# Patient Record
Sex: MALE | Race: WHITE | URBAN - METROPOLITAN AREA
[De-identification: names, ages, dates, MRNs, and addresses within clinical notes are randomized per-mention and may not be internally consistent; named-entity substitution may affect disease eponyms.]

---

## 2017-02-17 ENCOUNTER — OFFICE VISIT (OUTPATIENT)
Dept: OTOLARYNGOLOGY | Facility: CLINIC | Age: 52
End: 2017-02-17

## 2017-02-17 VITALS
HEIGHT: 72 IN | SYSTOLIC BLOOD PRESSURE: 116 MMHG | TEMPERATURE: 97 F | HEART RATE: 60 BPM | WEIGHT: 203 LBS | DIASTOLIC BLOOD PRESSURE: 77 MMHG | BODY MASS INDEX: 27.5 KG/M2

## 2017-02-17 DIAGNOSIS — J32.9 SINUSITIS, UNSPECIFIED CHRONICITY, UNSPECIFIED LOCATION: Primary | ICD-10-CM

## 2017-02-17 PROCEDURE — 99212 OFFICE O/P EST SF 10 MIN: CPT | Performed by: OTOLARYNGOLOGY

## 2017-02-17 PROCEDURE — 99214 OFFICE O/P EST MOD 30 MIN: CPT | Performed by: OTOLARYNGOLOGY

## 2017-02-17 NOTE — PROGRESS NOTES
Josiah Parra is a 45year old male. Patient presents with:   Follow - Up: regarding sinusitis, improvement in symptoms with medications       HISTORY OF PRESENT ILLNESS  He presents today with a history of chronic nasal polyposis with last episode occuring intolerance. Neuro Negative Tremors. Psych Negative Anxiety and depression. Integumentary Negative Frequent skin infections, pigment change and rash. Hema/Lymph Negative Easy bleeding and easy bruising.            PHYSICAL EXAM    /77 mmHg  Pu INHALE 1 PUFF BY MOUTH ONCE DAILY AT THE SAME TIME EACH DAY FOR 90 DAYS, Disp: , Rfl: 5  •  Fluticasone Propionate 50 MCG/ACT Nasal Suspension, PLACE 1 SPRAY INTO BOTH NOSTRILS TWO TIMES DAILY.  GENTLY BLOW NOSE PRIOR TO USE., Disp: , Rfl: 1  •  hydrochloro Tablet 12 Hr, Take 1 tablet by mouth every 12 (twelve) hours. , Disp: 60 tablet, Rfl: 3  ASSESSMENT AND PLAN    1. Sinusitis, unspecified chronicity, unspecified location  Much improved and current symptoms are tolerable. Continue meds for now and RTC in thr

## 2017-05-26 ENCOUNTER — OFFICE VISIT (OUTPATIENT)
Dept: OTOLARYNGOLOGY | Facility: CLINIC | Age: 52
End: 2017-05-26

## 2017-05-26 VITALS
BODY MASS INDEX: 42.84 KG/M2 | TEMPERATURE: 97 F | SYSTOLIC BLOOD PRESSURE: 133 MMHG | WEIGHT: 306 LBS | HEIGHT: 71 IN | DIASTOLIC BLOOD PRESSURE: 83 MMHG

## 2017-05-26 DIAGNOSIS — R51.9 FOREHEAD PAIN: Primary | ICD-10-CM

## 2017-05-26 PROCEDURE — 99214 OFFICE O/P EST MOD 30 MIN: CPT | Performed by: OTOLARYNGOLOGY

## 2017-05-26 PROCEDURE — 99212 OFFICE O/P EST SF 10 MIN: CPT | Performed by: OTOLARYNGOLOGY

## 2017-05-26 RX ORDER — LORATADINE 10 MG/1
10 TABLET ORAL
COMMUNITY

## 2017-05-26 RX ORDER — ALBUTEROL SULFATE 90 UG/1
AEROSOL, METERED RESPIRATORY (INHALATION)
COMMUNITY
Start: 2017-01-26

## 2017-05-26 RX ORDER — MELOXICAM 15 MG/1
15 TABLET ORAL DAILY
Qty: 30 TABLET | Refills: 3 | Status: SHIPPED | OUTPATIENT
Start: 2017-05-26

## 2017-05-26 RX ORDER — OMEPRAZOLE 20 MG/1
CAPSULE, DELAYED RELEASE ORAL
COMMUNITY
Start: 2017-03-09 | End: 2017-05-26

## 2017-05-26 RX ORDER — MONTELUKAST SODIUM 10 MG/1
TABLET ORAL
COMMUNITY
Start: 2017-01-26 | End: 2017-05-26

## 2017-05-26 RX ORDER — AMLODIPINE BESYLATE 5 MG/1
TABLET ORAL
COMMUNITY
Start: 2017-03-09 | End: 2017-05-26

## 2017-05-26 RX ORDER — BISOPROLOL FUMARATE 5 MG/1
5 TABLET ORAL
COMMUNITY
Start: 2017-04-07

## 2017-05-26 RX ORDER — HYDROCHLOROTHIAZIDE 25 MG/1
TABLET ORAL
COMMUNITY
Start: 2017-03-09 | End: 2017-05-26

## 2017-05-26 RX ORDER — AZELASTINE 1 MG/ML
SPRAY, METERED NASAL
COMMUNITY
Start: 2017-05-08 | End: 2017-05-26

## 2017-05-26 RX ORDER — TESTOSTERONE 16.2 MG/G
GEL TRANSDERMAL
COMMUNITY
Start: 2017-05-25

## 2017-06-09 NOTE — PROGRESS NOTES
Fortino Snellen is a 47year old male. Patient presents with: Follow - Up: regarding sinusitis, c/o daily headaches       HISTORY OF PRESENT ILLNESS  He presents today with a history of chronic nasal polyposis with last episode occuring in the 80's.  Recentl Blurred vision and vision changes. Respiratory Negative Dyspnea and wheezing. Cardio Negative Chest pain, irregular heartbeat/palpitations and syncope. GI Negative Abdominal pain and diarrhea.    Endocrine Negative Cold intolerance and heat intoleranc Rfl:   •  loratadine (CLARITIN) 10 MG Oral Tab, Take 10 mg by mouth., Disp: , Rfl:   •  Albuterol Sulfate HFA (PROAIR HFA) 108 (90 Base) MCG/ACT Inhalation Aero Soln, , Disp: , Rfl:   •  ANDROGEL PUMP 20.25 MG/ACT (1.62%) Transdermal Gel, , Disp: , Rfl: Oral Tab, Take 800 mg by mouth every 6 (six) hours as needed for Pain., Disp: , Rfl:   •  Loratadine-Pseudoephedrine ER 5-120 MG Oral Tablet 12 Hr, Take 1 tablet by mouth every 12 (twelve) hours. , Disp: 60 tablet, Rfl: 3  ASSESSMENT AND PLAN    1.  Forehead

## 2017-09-08 ENCOUNTER — OFFICE VISIT (OUTPATIENT)
Dept: OTOLARYNGOLOGY | Facility: CLINIC | Age: 52
End: 2017-09-08

## 2017-09-08 VITALS
SYSTOLIC BLOOD PRESSURE: 129 MMHG | DIASTOLIC BLOOD PRESSURE: 86 MMHG | WEIGHT: 306 LBS | TEMPERATURE: 97 F | BODY MASS INDEX: 42.84 KG/M2 | HEIGHT: 71 IN

## 2017-09-08 DIAGNOSIS — R51.9 FOREHEAD PAIN: Primary | ICD-10-CM

## 2017-09-08 DIAGNOSIS — J32.1 CHRONIC FRONTAL SINUSITIS: ICD-10-CM

## 2017-09-08 PROCEDURE — 99212 OFFICE O/P EST SF 10 MIN: CPT | Performed by: OTOLARYNGOLOGY

## 2017-09-08 PROCEDURE — 99214 OFFICE O/P EST MOD 30 MIN: CPT | Performed by: OTOLARYNGOLOGY

## 2017-09-08 RX ORDER — TADALAFIL 20 MG
TABLET ORAL
COMMUNITY
Start: 2017-08-23

## 2017-09-08 RX ORDER — PSEUDOEPHEDRINE HCL 120 MG/1
120 TABLET, FILM COATED, EXTENDED RELEASE ORAL EVERY 12 HOURS
Qty: 60 TABLET | Refills: 3 | Status: SHIPPED | OUTPATIENT
Start: 2017-09-08

## 2017-09-08 RX ORDER — LORATADINE 10 MG/1
10 TABLET ORAL DAILY
Qty: 30 TABLET | Refills: 3 | Status: SHIPPED | OUTPATIENT
Start: 2017-09-08 | End: 2017-12-19

## 2017-09-08 NOTE — PROGRESS NOTES
Khadijah Warren is a 47year old male. Patient presents with:   Follow - Up: 3 month follow up- forehead pain-per pt no changes/improvement of symptoms      HISTORY OF PRESENT ILLNESS  He presents today with a history of chronic nasal polyposis with last epis Years of education:                 Number of children:               Social History Main Topics    Smoking status: Former Smoker                                                                Packs/day: 0.00      Years: 0.00        Smokeless tobacco: Normal. Skull - Normal.        Nasopharynx Normal External nose - Normal. Lips/teeth/gums - Normal. Tonsils - Normal. Oropharynx - Normal.   Ears Normal Inspection - Right: Normal, Left: Normal. Canal - Right: Normal, Left: Normal. TM - Right: Normal, Left Disp: , Rfl: 0  •  omeprazole 20 MG Oral Capsule Delayed Release, TAKE 1 CAPSULE BY MOUTH TWO TIMES DAILY. , Disp: , Rfl: 0  •  Pravastatin Sodium 40 MG Oral Tab, Take 40 mg by mouth., Disp: , Rfl: 1  •  Testosterone 50 MG/5GM (1%) Transdermal Gel, APPLY TO

## 2017-09-18 ENCOUNTER — TELEPHONE (OUTPATIENT)
Dept: OTOLARYNGOLOGY | Facility: CLINIC | Age: 52
End: 2017-09-18

## 2017-09-18 NOTE — TELEPHONE ENCOUNTER
Called and spoke with the pt regarding approved CT scan order with authorization number [id number] valid from 9/18/17 until 12/17/17 with case number [id number], per Edmar Miller of [employer] pt needs to call [phone] to verify for facility for CT scan

## 2017-09-19 ENCOUNTER — TELEPHONE (OUTPATIENT)
Dept: OTOLARYNGOLOGY | Facility: CLINIC | Age: 52
End: 2017-09-19

## 2017-09-19 NOTE — TELEPHONE ENCOUNTER
Leonid calling to have Orders of [place] [facility] [place] [phone]. Please advise.  Thank you

## 2017-09-25 ENCOUNTER — TELEPHONE (OUTPATIENT)
Dept: OTOLARYNGOLOGY | Facility: CLINIC | Age: 52
End: 2017-09-25

## 2017-12-19 RX ORDER — LORATADINE 10 MG/1
TABLET ORAL
Qty: 30 TABLET | Refills: 3 | Status: SHIPPED | OUTPATIENT
Start: 2017-12-19 | End: 2018-04-25

## 2017-12-19 NOTE — TELEPHONE ENCOUNTER
Attanya Stokes. Pt is requesting refill for Loratadine 10 mg daily. LOV 09/08/17. Rx authorized on 09/08/17 for 30 tabs / 3 refills. Please review and advise.

## 2018-04-25 RX ORDER — LORATADINE 10 MG/1
TABLET ORAL
Qty: 30 TABLET | Refills: 3 | Status: SHIPPED | OUTPATIENT
Start: 2018-04-25 | End: 2018-08-19

## 2018-08-20 NOTE — TELEPHONE ENCOUNTER
Patient is requesting refill for Loratadine 10 mg daily. LOV 09/08/17. No previous refills authorized seen on chart. Please review and advise.

## 2018-08-21 RX ORDER — LORATADINE 10 MG/1
TABLET ORAL
Qty: 30 TABLET | Refills: 3 | Status: SHIPPED | OUTPATIENT
Start: 2018-08-21 | End: 2018-12-09

## 2018-11-13 ENCOUNTER — TELEPHONE (OUTPATIENT)
Dept: OTOLARYNGOLOGY | Facility: CLINIC | Age: 53
End: 2018-11-13

## 2018-11-13 NOTE — TELEPHONE ENCOUNTER
Current Outpatient Medications:   •  LORATADINE 10 MG Oral Tab, TAKE 1 TABLET BY MOUTH EVERY DAY, Disp:90 tablet, Rfl: 3    90 day supply RX [facility] [address], [place] [address]:  9/8/17    Last Refill:  8-19-18

## 2018-11-15 NOTE — TELEPHONE ENCOUNTER
LMTCB to inform patient per Dr Chico Godwin  patient need to return to office last visit was 9/8/17 if need a refill.

## 2018-11-20 NOTE — TELEPHONE ENCOUNTER
Patient is scheduled to see [facility] for yearly check up for refill last office visit was 9/8/17.

## 2018-11-26 ENCOUNTER — TELEPHONE (OUTPATIENT)
Dept: OTOLARYNGOLOGY | Facility: CLINIC | Age: 53
End: 2018-11-26

## 2018-11-26 ENCOUNTER — OFFICE VISIT (OUTPATIENT)
Dept: OTOLARYNGOLOGY | Facility: CLINIC | Age: 53
End: 2018-11-26
Payer: COMMERCIAL

## 2018-11-26 VITALS
BODY MASS INDEX: 42.66 KG/M2 | TEMPERATURE: 98 F | WEIGHT: 315 LBS | SYSTOLIC BLOOD PRESSURE: 118 MMHG | HEIGHT: 72 IN | DIASTOLIC BLOOD PRESSURE: 77 MMHG

## 2018-11-26 DIAGNOSIS — J32.9 SINUSITIS, UNSPECIFIED CHRONICITY, UNSPECIFIED LOCATION: Primary | ICD-10-CM

## 2018-11-26 PROCEDURE — 99212 OFFICE O/P EST SF 10 MIN: CPT | Performed by: OTOLARYNGOLOGY

## 2018-11-26 PROCEDURE — 99214 OFFICE O/P EST MOD 30 MIN: CPT | Performed by: OTOLARYNGOLOGY

## 2018-11-26 RX ORDER — CLINDAMYCIN HYDROCHLORIDE 300 MG/1
300 CAPSULE ORAL EVERY 8 HOURS
Qty: 30 CAPSULE | Refills: 0 | Status: SHIPPED | OUTPATIENT
Start: 2018-11-26 | End: 2018-12-03

## 2018-11-26 NOTE — PROGRESS NOTES
Darshan Kitchen is a 46year old male.   Patient presents with:  Sinus Problem: pt reports reports has a respiratory infection for 2wks, was seen in ED last night       HISTORY OF PRESENT ILLNESS  He presents today with a history of chronic nasal polyposis wit She is been on clarithromycin for about 10 days which she finished around Thanksgiving with incomplete resolution of her symptoms. She is been in the ED at [facility] 3 times at [place] once in the past week.   In 1 day he actually presented to [facility] twice for the PHYSICAL EXAM    /77 (BP Location: Left arm, Patient Position: Sitting, Cuff Size: adult)   Temp 98.1 °F (36.7 °C) (Oral)   Ht 6' (1.829 m)   Wt (!) 350 lb (158.8 kg)   BMI 47.47 kg/m²        Constitutional Normal Overall appearance - Rowan Flicker Rfl: 5  •  Fluticasone Propionate 50 MCG/ACT Nasal Suspension, PLACE 1 SPRAY INTO BOTH NOSTRILS TWO TIMES DAILY. GENTLY BLOW NOSE PRIOR TO USE., Disp: , Rfl: 1  •  hydrochlorothiazide 25 MG Oral Tab, TAKE 1 TABLET BY MOUTH DAILY.  BEST IF TAKEN WITH FOOD., HOURS AS NEEDED FOR COUGH, Disp: , Rfl: 0  •  ibuprofen 800 MG Oral Tab, Take 800 mg by mouth every 6 (six) hours as needed for Pain., Disp: , Rfl:   ASSESSMENT AND PLAN    1.  Sinusitis, unspecified chronicity, unspecified location  I will start him on cli

## 2018-11-26 NOTE — TELEPHONE ENCOUNTER
Pharm received rx for clindamycin - pt states 2 rx are supposed to be sent over - pt is at pharm now

## 2018-12-07 ENCOUNTER — OFFICE VISIT (OUTPATIENT)
Dept: OTOLARYNGOLOGY | Facility: CLINIC | Age: 53
End: 2018-12-07
Payer: COMMERCIAL

## 2018-12-07 VITALS
TEMPERATURE: 98 F | WEIGHT: 315 LBS | SYSTOLIC BLOOD PRESSURE: 122 MMHG | BODY MASS INDEX: 44.1 KG/M2 | DIASTOLIC BLOOD PRESSURE: 78 MMHG | HEIGHT: 71 IN

## 2018-12-07 DIAGNOSIS — J32.9 SINUSITIS, UNSPECIFIED CHRONICITY, UNSPECIFIED LOCATION: Primary | ICD-10-CM

## 2018-12-07 DIAGNOSIS — R49.0 HOARSENESS: ICD-10-CM

## 2018-12-07 PROCEDURE — 31575 DIAGNOSTIC LARYNGOSCOPY: CPT | Performed by: OTOLARYNGOLOGY

## 2018-12-07 PROCEDURE — 99212 OFFICE O/P EST SF 10 MIN: CPT | Performed by: OTOLARYNGOLOGY

## 2018-12-07 PROCEDURE — 99214 OFFICE O/P EST MOD 30 MIN: CPT | Performed by: OTOLARYNGOLOGY

## 2018-12-07 RX ORDER — SULFAMETHOXAZOLE AND TRIMETHOPRIM 800; 160 MG/1; MG/1
1 TABLET ORAL EVERY 12 HOURS
Qty: 20 TABLET | Refills: 0 | Status: SHIPPED | OUTPATIENT
Start: 2018-12-07 | End: 2018-12-17

## 2018-12-07 NOTE — PROGRESS NOTES
Ashley Hayden is a 46year old male. Patient presents with: Follow - Up: regarding sinusitis, improvement in symptoms       HISTORY OF PRESENT ILLNESS  He presents today with a history of chronic nasal polyposis with last episode occuring in the 80's.  Rec 10 days which she finished around Thanksgiving with incomplete resolution of her symptoms. She is been in the ED at [facility] 3 times at [facility] once in the past week.   In 1 day he actually presented to [facility] twice for the same problem and was given albuterol in intolerance. Neuro Negative Tremors. Psych Negative Anxiety and depression. Integumentary Negative Frequent skin infections, pigment change and rash. Hema/Lymph Negative Easy bleeding and easy bruising.            PHYSICAL EXAM    /78   Temp 9 diagnostic flexible fiberoptic laryngoscopy was performed. The flexible fiberoptic laryngoscope was placed into the nose or mouthand advanced  into the interior of the larynx. A thorough examination of the interior of the larynx was performed.    Findings w Aerosol Powder, Breath Activated, INHALE 1 PUFF BY MOUTH ONCE DAILY AT THE SAME TIME EACH DAY FOR 90 DAYS, Disp: , Rfl: 5  •  Fluticasone Propionate 50 MCG/ACT Nasal Suspension, PLACE 1 SPRAY INTO BOTH NOSTRILS TWO TIMES DAILY.  GENTLY BLOW NOSE PRIOR TO US exam reveals significant erythema and persistent mucopurulent material in the posterior nasopharynx and on the laryngeal mucosa. Severe erythema noted. No lesions or masses of the vocal cords.   Start Bactrim DS for 10 days as he seems to be having some i

## 2018-12-10 RX ORDER — LORATADINE 10 MG/1
TABLET ORAL
Qty: 30 TABLET | Refills: 3 | Status: SHIPPED | OUTPATIENT
Start: 2018-12-10 | End: 2019-03-23

## 2019-01-07 ENCOUNTER — OFFICE VISIT (OUTPATIENT)
Dept: OTOLARYNGOLOGY | Facility: CLINIC | Age: 54
End: 2019-01-07
Payer: COMMERCIAL

## 2019-01-07 VITALS
DIASTOLIC BLOOD PRESSURE: 82 MMHG | TEMPERATURE: 98 F | HEIGHT: 71 IN | BODY MASS INDEX: 44.1 KG/M2 | SYSTOLIC BLOOD PRESSURE: 131 MMHG | WEIGHT: 315 LBS

## 2019-01-07 DIAGNOSIS — J32.9 SINUSITIS, UNSPECIFIED CHRONICITY, UNSPECIFIED LOCATION: Primary | ICD-10-CM

## 2019-01-07 DIAGNOSIS — R49.0 HOARSENESS: ICD-10-CM

## 2019-01-07 PROCEDURE — 99212 OFFICE O/P EST SF 10 MIN: CPT | Performed by: OTOLARYNGOLOGY

## 2019-01-07 PROCEDURE — 99214 OFFICE O/P EST MOD 30 MIN: CPT | Performed by: OTOLARYNGOLOGY

## 2019-01-07 NOTE — PROGRESS NOTES
Fortino Snellen is a 46year old male. Patient presents with:  Nose Problem: Sinusitis: pt reports feels a lot better      HISTORY OF PRESENT ILLNESS  He presents today with a history of chronic nasal polyposis with last episode occuring in the 80's.  Recentl days which she finished around Thanksgiving with incomplete resolution of her symptoms. Kira Hauser is been in the ED at [facility] 3 times at [facility] once in the past week.  In 1 day he actually presented to [facility] twice for the same problem and was given albuterol inhal Negative Blurred vision and vision changes. Respiratory Negative Dyspnea and wheezing. Cardio Negative Chest pain, irregular heartbeat/palpitations and syncope. GI Negative Abdominal pain and diarrhea.    Endocrine Negative Cold intolerance and heat i 10 MG Oral Tab, TAKE 1 TABLET BY MOUTH EVERY DAY, Disp: 30 tablet, Rfl: 3  •  MetFORMIN HCl 500 MG Oral Tab, TAKE 1 TABLET BY MOUTH TWO TIMES DAILY (WITH BREAKFAST AND DINNER). , Disp: , Rfl: 0  •  Loratadine-Pseudoephedrine ER 5-120 MG Oral Tablet 12 Hr, T 6.25-10 MG/5ML Oral Syrup, TAKE 1 TEASPOONFUL BY MOUTH EVERY 6 HOURS AS NEEDED FOR COUGH, Disp: , Rfl: 0  •  Testosterone 50 MG/5GM (1%) Transdermal Gel, APPLY TOPICALLY DAILY USE AS DIRECTED, Disp: , Rfl: 3  •  LEVITRA 20 MG Oral Tab, TAKE 1 TABLET BY ELLI

## 2019-01-21 RX ORDER — SULFAMETHOXAZOLE AND TRIMETHOPRIM 800; 160 MG/1; MG/1
TABLET ORAL
Qty: 20 TABLET | Refills: 0 | OUTPATIENT
Start: 2019-01-21

## 2019-03-25 RX ORDER — LORATADINE 10 MG/1
TABLET ORAL
Qty: 30 TABLET | Refills: 3 | Status: SHIPPED | OUTPATIENT
Start: 2019-03-25 | End: 2019-07-31

## 2019-08-01 RX ORDER — LORATADINE 10 MG/1
TABLET ORAL
Qty: 30 TABLET | Refills: 3 | Status: SHIPPED | OUTPATIENT
Start: 2019-08-01

## 2020-12-23 ENCOUNTER — OFFICE VISIT (OUTPATIENT)
Dept: OTOLARYNGOLOGY | Facility: CLINIC | Age: 55
End: 2020-12-23
Payer: COMMERCIAL

## 2020-12-23 VITALS
HEART RATE: 77 BPM | BODY MASS INDEX: 44 KG/M2 | TEMPERATURE: 98 F | WEIGHT: 315 LBS | SYSTOLIC BLOOD PRESSURE: 139 MMHG | DIASTOLIC BLOOD PRESSURE: 83 MMHG

## 2020-12-23 DIAGNOSIS — H61.23 BILATERAL IMPACTED CERUMEN: Primary | ICD-10-CM

## 2020-12-23 DIAGNOSIS — R09.82 POSTNASAL DRIP: ICD-10-CM

## 2020-12-23 PROCEDURE — 3079F DIAST BP 80-89 MM HG: CPT | Performed by: OTOLARYNGOLOGY

## 2020-12-23 PROCEDURE — 99213 OFFICE O/P EST LOW 20 MIN: CPT | Performed by: OTOLARYNGOLOGY

## 2020-12-23 PROCEDURE — 69210 REMOVE IMPACTED EAR WAX UNI: CPT | Performed by: OTOLARYNGOLOGY

## 2020-12-23 PROCEDURE — 3075F SYST BP GE 130 - 139MM HG: CPT | Performed by: OTOLARYNGOLOGY

## 2020-12-23 RX ORDER — METHSCOPOLAMINE BROMIDE 2.5 MG/1
2.5 TABLET ORAL 2 TIMES DAILY
Qty: 60 TABLET | Refills: 3 | Status: SHIPPED | OUTPATIENT
Start: 2020-12-23 | End: 2021-05-04

## 2020-12-23 NOTE — PROGRESS NOTES
Christal Johnson is a 63year old male. Patient presents with:  Ear Problem: Pt. c/o L ear feeling clogged since monday. Both ears are itchy. No pain. Post Nasal Drip: Post nasal drop x one month. Took a 14 day course of clarithromycin with no improvement. ibuprofen which can be used several times a day.   11/26/18 he presents with a 5-week history of cough and URI-like symptoms. Kira Hauser is been on clarithromycin for about 10 days which she finished around Thanksgiving with incomplete resolution of her symptoms Smokeless tobacco: Current User    Substance and Sexual Activity      Alcohol use: No      Drug use: No      Family History   Problem Relation Age of Onset   • Cancer Father    • Hypertension Brother        Past Medical History:   Diagnosis Date   • Essent Inspection - Right: Normal, Left: Normal. Canal - Right: Normal, Left: Normal. TM - Right: Normal, Left: Normal.  Bilateral wax impaction cleared   Skin Normal Inspection - Normal.        Lymph Detail Normal Submental. Submandibular.  Anterior cervical. Pos hydrochlorothiazide 25 MG Oral Tab, TAKE 1 TABLET BY MOUTH DAILY. BEST IF TAKEN WITH FOOD., Disp: , Rfl: 0  •  Montelukast Sodium 10 MG Oral Tab, Take 10 mg by mouth., Disp: , Rfl: 0  •  BYSTOLIC 5 MG Oral Tab, Take 5 mg by mouth once daily. , Disp: , Rfl: cerumen    2. Postnasal drip  Both ears cleaned of all cerumen impaction using microscopy and suction. Start methscopolamine and continue with Singulair loratadine and Astelin/fluticasone.   Return to see me in a month if no better        This note was pre

## 2021-05-04 RX ORDER — METHSCOPOLAMINE BROMIDE 2.5 MG/1
TABLET ORAL
Qty: 180 TABLET | Refills: 1 | Status: SHIPPED | OUTPATIENT
Start: 2021-05-04

## 2022-02-28 NOTE — TELEPHONE ENCOUNTER
This refill request is being sent to the provider for the following reason:  [x]Patient has not had an appointment within the past 12 months but has made an appointment on: ___  []Medication is not within protocol  []Patient did not complete follow up recommendations  []Other: ___  Pt contacted been it has been more than a year since office visit declined to schedule appt.

## 2022-03-02 RX ORDER — METHSCOPOLAMINE BROMIDE 2.5 MG/1
TABLET ORAL
Qty: 180 TABLET | Refills: 0 | OUTPATIENT
Start: 2022-03-02

## 2022-04-15 ENCOUNTER — OFFICE VISIT (OUTPATIENT)
Dept: OTOLARYNGOLOGY | Facility: CLINIC | Age: 57
End: 2022-04-15
Payer: COMMERCIAL

## 2022-04-15 VITALS — TEMPERATURE: 97 F | WEIGHT: 297 LBS | BODY MASS INDEX: 41.58 KG/M2 | HEIGHT: 71 IN

## 2022-04-15 DIAGNOSIS — H61.23 BILATERAL IMPACTED CERUMEN: Primary | ICD-10-CM

## 2022-04-15 PROCEDURE — 69210 REMOVE IMPACTED EAR WAX UNI: CPT | Performed by: OTOLARYNGOLOGY

## 2022-04-15 PROCEDURE — 3008F BODY MASS INDEX DOCD: CPT | Performed by: OTOLARYNGOLOGY

## 2022-11-12 ENCOUNTER — OFFICE VISIT (OUTPATIENT)
Dept: OTOLARYNGOLOGY | Facility: CLINIC | Age: 57
End: 2022-11-12
Payer: COMMERCIAL

## 2022-11-12 DIAGNOSIS — R09.82 POSTNASAL DRIP: ICD-10-CM

## 2022-11-12 DIAGNOSIS — H61.23 BILATERAL IMPACTED CERUMEN: Primary | ICD-10-CM

## 2022-11-12 RX ORDER — PSEUDOEPHEDRINE HCL 120 MG/1
120 TABLET, FILM COATED, EXTENDED RELEASE ORAL EVERY 12 HOURS
Qty: 60 TABLET | Refills: 3 | Status: SHIPPED | OUTPATIENT
Start: 2022-11-12

## 2022-12-06 ENCOUNTER — TELEPHONE (OUTPATIENT)
Dept: OTOLARYNGOLOGY | Facility: CLINIC | Age: 57
End: 2022-12-06

## 2022-12-06 NOTE — TELEPHONE ENCOUNTER
Per pt left side of ear is still crackling and worsened, pt states you advised during office visit that if this happened you would prescribe a steroid.  Please advise

## 2022-12-06 NOTE — TELEPHONE ENCOUNTER
Pt called stating pt finished the medication but no improvement with the ear. Advised could rx. Something else.   [facility]  Please call pt

## 2022-12-07 RX ORDER — METHYLPREDNISOLONE 4 MG/1
TABLET ORAL
Qty: 21 TABLET | Refills: 0 | Status: SHIPPED | OUTPATIENT
Start: 2022-12-07

## 2022-12-22 ENCOUNTER — OFFICE VISIT (OUTPATIENT)
Dept: OTOLARYNGOLOGY | Facility: CLINIC | Age: 57
End: 2022-12-22
Payer: COMMERCIAL

## 2022-12-22 DIAGNOSIS — H69.81 DYSFUNCTION OF RIGHT EUSTACHIAN TUBE: Primary | ICD-10-CM

## 2022-12-22 PROCEDURE — 99213 OFFICE O/P EST LOW 20 MIN: CPT | Performed by: OTOLARYNGOLOGY

## 2022-12-22 RX ORDER — PSEUDOEPHEDRINE HCL 120 MG/1
120 TABLET, FILM COATED, EXTENDED RELEASE ORAL EVERY 12 HOURS
Qty: 60 TABLET | Refills: 3 | Status: SHIPPED | OUTPATIENT
Start: 2022-12-22

## 2023-12-22 ENCOUNTER — OFFICE VISIT (OUTPATIENT)
Dept: OTOLARYNGOLOGY | Facility: CLINIC | Age: 58
End: 2023-12-22

## 2023-12-22 DIAGNOSIS — H61.23 BILATERAL IMPACTED CERUMEN: ICD-10-CM

## 2023-12-22 DIAGNOSIS — R09.82 POSTNASAL DRIP: Primary | ICD-10-CM

## 2023-12-22 RX ORDER — GLYCOPYRROLATE 1 MG/1
1 TABLET ORAL 3 TIMES DAILY
Qty: 90 TABLET | Refills: 1 | Status: SHIPPED | OUTPATIENT
Start: 2023-12-22

## 2023-12-22 NOTE — PROGRESS NOTES
Jeremy Del Cid is a 60year old male. Chief Complaint   Patient presents with    Ear Wax    Sinus Problem     Patient reports post nasal drip. HISTORY OF PRESENT ILLNESS  He presents today with a history of chronic nasal polyposis with last episode occuring in the [de-identified]. Recently having nasal congestion and brown nasal drainage. Some sinus discomfort. Using Singulair and Astelin and fluticasone. Started on antibiotic orally with no help. Recently started on Prednisone without help. Constantly feels congested. No other signs, symptoms or complaints. 2/17/17 Last visit started on Loratadine D and Cipro irrigations. Irrigations gave him diarrhea. He stopped them On Singulair, Loratadine D and Flonase/Astelin he has been feeling very good. Minimal congestion and associated sinus symptoms. No new signs, symptoms or complaints. 5/26/17  He has been using Singulair, Loratadine D and having good control of his sinus issues. Lately he has been complaining of bitemporal headaches and forehead pain. No new sinus symptoms. TMJ issues? Grinding? No other signs, symptoms or complaints. 9/8/17 since I last saw him he continues to have he does seem to have some visual symptoms and states that he sees spots when he gets these headaches. They typically occur when he awakens in the morning but can happen throughout the day as well. He does not seem to be having any allergy related symptoms and denies facial frontal discomfort. Pressure or nasal congestion or obstruction. Migraines? He does have occipital pain that occurs as well in conjunction with these frontal headaches but notes that sleeping seems to alleviate the headache as well as the use of high-dose ibuprofen. He was prescribed Mobic for some apparent TMJ issues and states that this does help him but he prefers the ibuprofen which can be used several times a day. 11/26/18 he presents with a 5-week history of cough and URI-like symptoms.   She is been on clarithromycin for about 10 days which she finished around Thanksgiving with incomplete resolution of her symptoms. She is been in the ED at [facility] 3 times at [facility] once in the past week. In 1 day he actually presented to [facility] twice for the same problem and was given albuterol inhalers. He was on steroids which she finished around Thanksgiving as well. He took this for about 10 days to 50 mg daily. Long history of chronic polyposis but has been stable on Singulair and fluticasone with occasional use of Astelin nasal spray. Not using any antihistamines or decongestants. 12/6/18 last visit he continued all of his allergy medications and I started him on clindamycin for 10 days. He has no further cough at this time but still has some hoarseness. Overall he feels much better except for some fullness in his ears. 1/7/19 since I last saw him he is been using all of his allergy medications as well as nasal sprays with near complete resolution of his hoarseness nasal congestion and postnasal drip. No new signs, symptoms or complaints. He continues to use all of his meds. 12/23/2020 since I last saw him he has been using Singulair loratadine Astelin and fluticasone throughout the day with good control of his allergies up until recently. Now having significant postnasal discharge which is not helped by antibiotics. He was on antibiotic for a good week without any change in his symptoms. Throat clearing and cough. Ears feel plugged bilaterally and he does have a history of wax impaction. 4/15/22 here for routine ear cleaning. Actually doing quite well with respect to his allergies. Affected by the recall of his [employer] sleep apnea machine. Using a borrowed machine at this time. 11/12/22 here for ear cleaning complains of crackling popping on the right he has been using some peroxide drops and some other OTC drops with no help and the crackling and popping sensation on the right.   Did have a flu shot developed the flu subsequent to that and tested positive for COVID but was rather asymptomatic with only congestion. Off work for 10 days cracking popping started after this congestive process. 12/22/22 he complains of some crackling and popping in his ears which has been going on for some time. He has had COVID with nasal congestion in the past 2 months. He has been using allergy medications including Claritin and Singulair and nasal sprays but not using any Sudafed. No other signs, symptoms or complaints. 12/22/23 likely popping issues that he had at a year ago took about 8 months to resolve. This was thought to be secondary to chronic congestion from COVID. States that he caught some other type of upper respiratory tract infection recently and has had chronic postnasal discharge since then. Constantly feels phlegm running down the back of his throat. When he coughs up more recently it has been clear although in the past it was kind of chunky and yellowish or greenish in color. No other signs, symptoms or complaints      Social History     Socioeconomic History    Marital status: Single   Tobacco Use    Smoking status: Former    Smokeless tobacco: Current   Vaping Use    Vaping Use: Never used   Substance and Sexual Activity    Alcohol use: No    Drug use: No       Family History   Problem Relation Age of Onset    Cancer Father     Hypertension Brother        Past Medical History:   Diagnosis Date    Essential hypertension     Hyperlipidemia     Sleep apnea        Past Surgical History:   Procedure Laterality Date    CARPAL TUNNEL RELEASE Bilateral     NASAL SURG PROC UNLISTED  1987    nasal polypectomy         REVIEW OF SYSTEMS    System Neg/Pos Details   Constitutional Negative Fatigue, fever and weight loss. ENMT Negative Drooling. Eyes Negative Blurred vision and vision changes. Respiratory Negative Dyspnea and wheezing.    Cardio Negative Chest pain, irregular heartbeat/palpitations and syncope. GI Negative Abdominal pain and diarrhea. Endocrine Negative Cold intolerance and heat intolerance. Neuro Negative Tremors. Psych Negative Anxiety and depression. Integumentary Negative Frequent skin infections, pigment change and rash. Hema/Lymph Negative Easy bleeding and easy bruising. PHYSICAL EXAM    There were no vitals taken for this visit. Constitutional Normal Overall appearance - Normal.   Psychiatric Normal Orientation - Oriented to time, place, person & situation. Appropriate mood and affect. Neck Exam Normal Inspection - Normal. Palpation - Normal. Parotid gland - Normal. Thyroid gland - Normal.   Eyes Normal Conjunctiva - Right: Normal, Left: Normal. Pupil - Right: Normal, Left: Normal. Fundus - Right: Normal, Left: Normal.   Neurological Normal Memory - Normal. Cranial nerves - Cranial nerves II through XII grossly intact. Head/Face Normal Facial features - Normal. Eyebrows - Normal. Skull - Normal.        Nasopharynx Normal External nose - Normal. Lips/teeth/gums - Normal. Tonsils - Normal. Oropharynx - Normal.   Ears Normal Inspection - Right: Normal, Left: Normal. Canal - Right: Normal, Left: Normal. TM - Right: Normal, Left: Normal.  Wax impaction bilaterally   Skin Normal Inspection - Normal.        Lymph Detail Normal Submental. Submandibular. Anterior cervical. Posterior cervical. Supraclavicular. Nose/Mouth/Throat Normal External nose - Normal. Lips/teeth/gums - Normal. Tonsils - Normal. Oropharynx - Normal.   Nose/Mouth/Throat Normal Nares - Right: Normal Left: Normal. Septum -Normal  Turbinates - Right: Normal, Left: Normal.  Nasal mucosa-congested   Microscopy  Binocular microscopy was performed. The affected ear(s) was/were examined and all debris removed using suction. The findings are described in the physical exam.   Ears cleaned of cerumen impaction bilaterally using microscopy and curette.   Normal exam otherwise normal tympanic membranes and ear canals bilaterally    Current Outpatient Medications:     glycopyrrolate 1 MG Oral Tab, Take 1 tablet (1 mg total) by mouth 3 (three) times daily. , Disp: 90 tablet, Rfl: 1    pseudoephedrine  MG Oral Tablet 12 Hr, Take 1 tablet (120 mg total) by mouth every 12 (twelve) hours. , Disp: 60 tablet, Rfl: 3    methylPREDNISolone 4 MG Oral Tablet Therapy Pack, Take by oral route., Disp: 21 tablet, Rfl: 0    pseudoephedrine  MG Oral Tablet 12 Hr, Take 1 tablet (120 mg total) by mouth every 12 (twelve) hours. , Disp: 60 tablet, Rfl: 3    METHSCOPOLAMINE BROMIDE 2.5 MG Oral Tab, TAKE 1 TABLET BY MOUTH TWICE A DAY, Disp: 180 tablet, Rfl: 1    LORATADINE 10 MG Oral Tab, TAKE 1 TABLET BY MOUTH EVERY DAY, Disp: 30 tablet, Rfl: 3    MetFORMIN HCl 500 MG Oral Tab, , Disp: , Rfl: 0    Loratadine-Pseudoephedrine ER 5-120 MG Oral Tablet 12 Hr, Take 1 tablet by mouth every 12 (twelve) hours. , Disp: 60 tablet, Rfl: 3    CIALIS 20 MG Oral Tab, , Disp: , Rfl:     Pseudoephedrine HCl  MG Oral Tablet 12 Hr, Take 1 tablet (120 mg total) by mouth every 12 (twelve) hours. , Disp: 60 tablet, Rfl: 3    Bisoprolol Fumarate 5 MG Oral Tab, Take 1 tablet (5 mg total) by mouth., Disp: , Rfl:     loratadine 10 MG Oral Tab, Take 1 tablet (10 mg total) by mouth., Disp: , Rfl:     albuterol 108 (90 Base) MCG/ACT Inhalation Aero Soln, , Disp: , Rfl:     ANDROGEL PUMP 20.25 MG/ACT (1.62%) Transdermal Gel, , Disp: , Rfl:     Meloxicam 15 MG Oral Tab, Take 1 tablet (15 mg total) by mouth daily. , Disp: 30 tablet, Rfl: 3    AmLODIPine Besylate 5 MG Oral Tab, Take 1 tablet (5 mg total) by mouth once daily. , Disp: , Rfl: 0    Azelastine HCl 0.1 % Nasal Solution, APPLY 1 SPRAY INTO BOTH NOSTRILS TWO TIMES DAILY.  GENTLY BLOW NOSE PRIOR TO USE., Disp: , Rfl: 0    BREO ELLIPTA 100-25 MCG/INH Inhalation Aerosol Powder, Breath Activated, INHALE 1 PUFF BY MOUTH ONCE DAILY AT THE SAME TIME EACH DAY FOR 90 DAYS, Disp: , Rfl: 5    Fluticasone Propionate 50 MCG/ACT Nasal Suspension, PLACE 1 SPRAY INTO BOTH NOSTRILS TWO TIMES DAILY. GENTLY BLOW NOSE PRIOR TO USE., Disp: , Rfl: 1    hydrochlorothiazide 25 MG Oral Tab, TAKE 1 TABLET BY MOUTH DAILY. BEST IF TAKEN WITH FOOD., Disp: , Rfl: 0    Montelukast Sodium 10 MG Oral Tab, Take 1 tablet (10 mg total) by mouth., Disp: , Rfl: 0    BYSTOLIC 5 MG Oral Tab, Take 1 tablet (5 mg total) by mouth once daily. , Disp: , Rfl: 0    omeprazole 20 MG Oral Capsule Delayed Release, TAKE 1 CAPSULE BY MOUTH TWO TIMES DAILY. , Disp: , Rfl: 0    Pravastatin Sodium 40 MG Oral Tab, Take 1 tablet (40 mg total) by mouth., Disp: , Rfl: 1    promethazine-codeine 6.25-10 MG/5ML Oral Syrup, , Disp: , Rfl: 0    Testosterone 50 MG/5GM (1%) Transdermal Gel, APPLY TOPICALLY DAILY USE AS DIRECTED, Disp: , Rfl: 3    LEVITRA 20 MG Oral Tab, TAKE 1 TABLET BY MOUTH AS NEEDED FOR SEX, Disp: , Rfl: 1    Glucosamine-Chondroitin (GLUCOSAMINE CHONDR COMPLEX OR), Take 1 tablet by mouth daily. , Disp: , Rfl:     KRILL OIL OR, Take 1 tablet by mouth daily. , Disp: , Rfl:     ibuprofen 800 MG Oral Tab, Take 1 tablet (800 mg total) by mouth every 6 (six) hours as needed for Pain., Disp: , Rfl:     Cholecalciferol (VITAMIN D) 1000 UNITS Oral Tab, Take 1 tablet by mouth daily. , Disp: , Rfl:     Loratadine-Pseudoephedrine ER 5-120 MG Oral Tablet 12 Hr, Take 1 tablet by mouth every 12 (twelve) hours. , Disp: 60 tablet, Rfl: 3  ASSESSMENT AND PLAN    1. Postnasal drip    2. Bilateral impacted cerumen  Ears cleaned of cerumen impaction bilaterally. No middle ear fluid no infection. I did reassure him that his ears actually look very good today. He does have chronic postnasal drip secondary to recent infection a few weeks ago. I will start him on glycopyrrolate to be used 3 times daily. If he gets too dry he can switch to half a tablet 3 times daily.   Return to see me as needed or in 1 month if no better        This note was prepared using [facility] [employer] voice recognition dictation software. As a result errors may occur. When identified these errors have been corrected. While every attempt is made to correct errors during dictation discrepancies may still exist    Topher Burkett MD    12/22/2023    5:01 PM

## 2024-02-23 NOTE — TELEPHONE ENCOUNTER
This refill request is being sent to the provider for the following reason:  []Patient has not had an appointment within the past 12 months but has made an appointment on: ___  [x]Medication is not within protocol  []Patient did not complete follow up recommendations  []Other: ___     Doctor please review and sign, if appropriate.

## 2024-02-26 RX ORDER — GLYCOPYRROLATE 1 MG/1
1 TABLET ORAL 3 TIMES DAILY
Qty: 90 TABLET | Refills: 1 | Status: SHIPPED | OUTPATIENT
Start: 2024-02-26

## 2024-04-18 NOTE — TELEPHONE ENCOUNTER
This refill request is being sent to the provider for the following reason:  []Patient has not had an appointment within the past 12 months but has made an appointment on: ___  [x]Medication is not within protocol  []Patient did not complete follow up recommendations  []Other: ___       Last OV 12/22/23

## 2024-04-22 RX ORDER — GLYCOPYRROLATE 1 MG/1
1 TABLET ORAL 3 TIMES DAILY
Qty: 90 TABLET | Refills: 1 | Status: SHIPPED | OUTPATIENT
Start: 2024-04-22

## 2024-06-03 NOTE — TELEPHONE ENCOUNTER
This refill request is being sent to the provider for the following reason:  []Patient has not had an appointment within the past 12 months but has made an appointment on: ___  [x]Medication is not within protocol - Glycopyrrolate  []Patient did not complete follow up recommendations  []Other: ___     Last OV 12/22/23, you wanted to see him in 1 month if not better.

## 2024-06-05 RX ORDER — GLYCOPYRROLATE 1 MG/1
1 TABLET ORAL 3 TIMES DAILY
Qty: 90 TABLET | Refills: 1 | Status: SHIPPED | OUTPATIENT
Start: 2024-06-05

## 2024-08-30 RX ORDER — GLYCOPYRROLATE 1 MG/1
1 TABLET ORAL 3 TIMES DAILY
Qty: 90 TABLET | Refills: 0 | Status: SHIPPED | OUTPATIENT
Start: 2024-08-30

## 2024-09-30 RX ORDER — GLYCOPYRROLATE 1 MG/1
1 TABLET ORAL 3 TIMES DAILY
Qty: 90 TABLET | Refills: 0 | Status: SHIPPED | OUTPATIENT
Start: 2024-09-30

## 2024-09-30 NOTE — TELEPHONE ENCOUNTER
Refill request sent to pharmacy on 9/30/24 for Glycopyrrolate, last OV on 12/22/23, ok per DR. Smith

## 2024-11-12 NOTE — TELEPHONE ENCOUNTER
This refill request is being sent to the provider for the following reason:  []Patient has not had an appointment within the past 12 months but has made an appointment on: ___  []Medication is not within protocol  [x]Patient did not complete follow up recommendations  []Other: _no follow up appointment scheduled __

## 2024-11-13 RX ORDER — GLYCOPYRROLATE 1 MG/1
1 TABLET ORAL 3 TIMES DAILY
Qty: 90 TABLET | Refills: 0 | OUTPATIENT
Start: 2024-11-13

## (undated) NOTE — MR AVS SNAPSHOT
[facility]  [address], [address]  [phone]               Thank you for choosing us for your health care visit with Elisabet Santana.  Sharlene Whitmore MD.  We are glad to serve you and happy to provide you with this summary BREO ELLIPTA 100-25 MCG/INH Aepb   Generic drug:  Fluticasone Furoate-Vilanterol   INHALE 1 PUFF BY MOUTH ONCE DAILY AT THE SAME TIME EACH DAY FOR 90 DAYS           BYSTOLIC 5 MG Tabs   Generic drug:  Nebivolol HCl   Take 5 mg by mouth once daily. PROAIR  (90 Base) MCG/ACT Aers   Generic drug:  Albuterol Sulfate HFA   What changed:  Another medication with the same name was removed. Continue taking this medication, and follow the directions you see here.            promethazine-codeine 6.25-1

## (undated) NOTE — MR AVS SNAPSHOT
[facility]  [address], [address]  [phone]               Thank you for choosing us for your health care visit with Jaspreet Richard.  Edie Waldrop MD.  We are glad to serve you and happy to provide you with this summary Generic drug:  Nebivolol HCl   Take 5 mg by mouth once daily. Ciprofloxacin HCl 500 MG Tabs   Take 1 tablet (500 mg total) by mouth every 12 (twelve) hours.    Commonly known as:  CIPRO           Fluticasone Propionate 50 MCG/ACT Susp   PLACE 1 SP Visit [facility] online at  [email or web address]